# Patient Record
Sex: MALE | Race: WHITE | Employment: FULL TIME | URBAN - METROPOLITAN AREA
[De-identification: names, ages, dates, MRNs, and addresses within clinical notes are randomized per-mention and may not be internally consistent; named-entity substitution may affect disease eponyms.]

---
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
## 2022-06-16 ENCOUNTER — OFFICE VISIT (OUTPATIENT)
Dept: ORTHOPEDIC SURGERY | Age: 22
End: 2022-06-16
Payer: COMMERCIAL

## 2022-06-16 VITALS — HEIGHT: 73 IN | BODY MASS INDEX: 23.86 KG/M2 | WEIGHT: 180 LBS

## 2022-06-16 DIAGNOSIS — S43.432A SUPERIOR LABRUM ANTERIOR-TO-POSTERIOR (SLAP) TEAR OF LEFT SHOULDER: ICD-10-CM

## 2022-06-16 DIAGNOSIS — M25.512 ACUTE PAIN OF LEFT SHOULDER: Primary | ICD-10-CM

## 2022-06-16 PROCEDURE — 99203 OFFICE O/P NEW LOW 30 MIN: CPT | Performed by: ORTHOPAEDIC SURGERY

## 2022-06-16 RX ORDER — LIDOCAINE 50 MG/G
1 PATCH TOPICAL DAILY
COMMUNITY
Start: 2021-10-20

## 2022-06-16 RX ORDER — LANSOPRAZOLE 30 MG/1
30 CAPSULE, DELAYED RELEASE ORAL DAILY
COMMUNITY
Start: 2021-10-17

## 2022-06-16 NOTE — PROGRESS NOTES
Joe Vieira presents today for traumatic injury involving his left shoulder. He works as a . He was using 2 pipe wrenches about 2 weeks ago when he felt a tearing popping sensation in the left shoulder. But the next day the pain was so intense he has been incapable of continue with activities. Rest pain is 2 out of 10 but worse with activity. He has pain lifting, raising his arm, reaching across the body and behind his back. He has been using ice and ibuprofen. He denies any prior shoulder problems. He is otherwise healthy. He is right-hand dominant. History: Patient's relevant past family, medical, and social history are reviewed as part of today's visit. ROS of pertinent positives and negatives as above; otherwise negative. General Exam:    Vitals: Height 6' 1\" (1.854 m), weight 180 lb (81.6 kg). Constitutional: Patient is adequately groomed with no evidence of malnutrition  Mental Status: The patient is oriented to time, place and person. The patient's mood and affect are appropriate. Gait:  Patient walks with normal gait and station. Lymphatic: The lymphatic examination bilaterally reveals all areas to be without enlargement or induration. Vascular: Examination reveals no swelling or calf tenderness. Peripheral pulses are palpable and 2+. Neurological: The patient has good coordination. There is no weakness or sensory deficit. Skin:    Head/Neck: inspection reveals no rashes, ulcerations or lesions. Trunk:  inspection reveals no rashes, ulcerations or lesions. Right Lower Extremity: inspection reveals no rashes, ulcerations or lesions. Left Lower Extremity: inspection reveals no rashes, ulcerations or lesions. Examination of the cervical spine reveals no restriction in motion. There are no reproduction of symptoms into either arm with flexion, extension, rotation or palpation. The patient has a negative Spurling sign, and no tenderness.     Examination of the right shoulder reveals normal scapular control and no prominence. There is no pain over the acromioclavicular or sternoclavicular joints. The patient has no biceps pain. There is full range of motion. There is no pain with impingement testing. There is no pain with Anderson maneuver. Ida's maneuver is normal.  There is no pain or apprehension in the abducted externally rotated position. There is no sulcus sign. There is no instability with anterior or posterior stress applied. The patient demonstrates full strength in the supraspinatus, infraspinatus, and subscapularis. Neurologic and vascular examination of the upper extremity  is normal.    Left shoulder has significant discomfort with cross body adduction and pain stressing the supraspinatus as well as with Ida's maneuver. He has mild tenderness anteriorly over the bicep. He has no gross instability. Neurologic and vascular exam to the left upper extremity are normal.    Xrays of the left shoulder were obtained today in the office and interpreted by me. AP in the scapular plane, axillary lateral, and scapular Y. These demonstrate: No bony abnormality        Assessment: Left shoulder superior labrum tear versus cuff tear    Plan: MRI left shoulder.     Follow-up with me after the scan

## 2022-06-23 ENCOUNTER — OFFICE VISIT (OUTPATIENT)
Dept: ORTHOPEDIC SURGERY | Age: 22
End: 2022-06-23
Payer: COMMERCIAL

## 2022-06-23 VITALS — BODY MASS INDEX: 23.86 KG/M2 | RESPIRATION RATE: 12 BRPM | HEIGHT: 73 IN | WEIGHT: 180 LBS

## 2022-06-23 DIAGNOSIS — S46.912A LEFT SHOULDER STRAIN, INITIAL ENCOUNTER: ICD-10-CM

## 2022-06-23 DIAGNOSIS — M25.512 ACUTE PAIN OF LEFT SHOULDER: Primary | ICD-10-CM

## 2022-06-23 PROCEDURE — 99213 OFFICE O/P EST LOW 20 MIN: CPT | Performed by: ORTHOPAEDIC SURGERY

## 2022-06-23 RX ORDER — METHYLPREDNISOLONE 4 MG/1
TABLET ORAL
Qty: 1 KIT | Refills: 0 | Status: SHIPPED | OUTPATIENT
Start: 2022-06-23 | End: 2022-08-22 | Stop reason: ALTCHOICE

## 2022-06-23 NOTE — PROGRESS NOTES
Surinder Garner returns today to follow-up his left shoulder. With activity pain is about 2 out of 10. He says quick movements seem to cause more discomfort and slower ones. He is a bit frustrated. We obtained his MRI. General Exam:    Vitals: Resp. rate 12, height 6' 1\" (1.854 m), weight 180 lb (81.6 kg). Constitutional: Patient is adequately groomed with no evidence of malnutrition  Mental Status: The patient is oriented to time, place and person. The patient's mood and affect are appropriate. Left shoulder today has no anterior discomfort or apprehension. He has mild pain with [place]'s maneuver. I am not able to elicit much in terms of discomfort. MRI left shoulder is reviewed. It demonstrates  Exam Date: 06/22/2022   Exam Description: MR Left Shoulder joint w/o Contrast            HISTORY:  Evaluate left shoulder pain.       TECHNICAL FACTORS:  Long- and short-axis fat- and water-weighted images were performed.       COMPARISON:  None.       FINDINGS:  Long head biceps is intact       Mild cuff tendinopathy and peritendinitis.  No cuff tear.       No fracture.  No mass.       No muscle strain.  No muscle atrophy.       Edema changes within the glenoid.  Red marrow variation present.       Sublabral foramen or hole present.  Lobulated region of fluid signal anterior to the sublabral    hole measures 10 x 5 x 9 mm.  Capsular fluid prolapses into this defect as a variant favored    over a paralabral cyst.       CONCLUSION:   1. Mild cuff tendinopathy and peritendinitis. No cuff tear. 2. Sublabral foramen or hole present. Lobulated fluid signal anterior to the sublabral hole    measures 10 x 5 x 9 mm. Capsular fluid prolapses into this defect as a variant favored over a    paralabral cyst.   3. No fracture, AVN or mass. I reviewed this findings on the report and the images with the patient. Assessment: Left shoulder sublabial foramen versus capsular injury.     Plan: Given his occupation as a , it would be very challenging for him to consider surgical treatment. His exam today is benign and I do not think surgery is indicated. I am recommending a Medrol Dosepak that he should start tomorrow. We will also start therapy. Follow-up with me in 3 weeks. He agrees with this plan.

## 2022-07-28 ENCOUNTER — HOSPITAL ENCOUNTER (OUTPATIENT)
Dept: PHYSICAL THERAPY | Age: 22
Setting detail: THERAPIES SERIES
Discharge: HOME | End: 2022-07-28
Payer: COMMERCIAL

## 2022-07-28 PROCEDURE — 97530 THERAPEUTIC ACTIVITIES: CPT

## 2022-07-28 PROCEDURE — 97110 THERAPEUTIC EXERCISES: CPT

## 2022-07-28 PROCEDURE — 97161 PT EVAL LOW COMPLEX 20 MIN: CPT

## 2022-07-28 NOTE — FLOWSHEET NOTE
[facility], [place]      Physical Therapy Daily Treatment Note  Date:  2022    Patient Name:  Princess Shepard    :  2000  MRN: [id number]  Medical/Treatment Diagnosis Information:  Diagnosis: Q46.809B (ICD-10-CM) - Left shoulder strain, initial encounter  Treatment Diagnosis: M25.512 Left shoulder pain  Insurance/Certification information:  PT Insurance Information: [employer]  Physician Information:  Referring Provider (secondary): Qi  Has the plan of care been signed (Y/N):        []  Yes  [x]  No     Date of Patient follow up with Physician:       Is this a Progress Report:     []  Yes  [x]  No        Progress report will be due (10 Rx or 30 days whichever is less):        Recertification will be due (POC Duration  / 90 days whichever is less):          Visit # Insurance Allowable Auth Required   1 30 []  Yes []  No        Functional Scale: FOTO 52    Date assessed:  22      Latex Allergy:  [x]NO      []YES  Preferred Language for Healthcare:   [x]English       []other:    Pain level:  10 with activity/lifting /10     SUBJECTIVE:  See eval    OBJECTIVE: See eval  Observation:   Test measurements:      ROM PROM AROM  Comment    L R L R    Flexion        Abduction        ER        IR        Other        Other             Strength L R Comment   Flexion      Abduction      ER      IR      Supraspinatus      Upper Trap      Lower Trap      Mid Trap      Rhomboids      Biceps      Triceps      Horizontal Abduction      Horizontal Adduction      Lats          RESTRICTIONS/PRECAUTIONS:     Exercises/Interventions:   Exercise/Equipment Resistance/Repetitions Other comments   Aerobic Conditioning     Aerodyne          Stretching/PROM     Wand ER at 90 10x10     Table Slides     Wall slides  Flex 10x10     UE [place]     Pulleys     Pendulum     BB IR     SL IR     Pec doorway stretch     CBA stretch     UT stretch     LS stretch     Isometrics     Retraction      Weight shift     Flexion 10x10    Abduction 10x10    External Rotation     Internal Rotation     Biceps     Triceps          PRE's     Flexion     Abduction     External Rotation 3x10 SL 0#    Internal Rotation     Shrugs     EXT     Reverse Flys     Serratus     Horizontal Abd with ER     Biceps     Triceps     Retraction Prone T LUE only 3x10          Cable Column/Theraband     External Rotation     Internal Rotation     Shrugs     Lats     Ext     Flex     Scapular Retraction     BIC     TRIC     PNF          Dynamic Stability          Plyoback                Therapeutic Exercise and NMR EXR  [] (80041) Provided verbal/tactile cueing for activities related to strengthening, flexibility, endurance, ROM  for improvements in scapular, scapulothoracic and UE control with self care, reaching, carrying, lifting, house/yardwork, driving/computer work.    [] (51690) Provided verbal/tactile cueing for activities related to improving balance, coordination, kinesthetic sense, posture, motor skill, proprioception  to assist with  scapular, scapulothoracic and UE control with self care, reaching, carrying, lifting, house/yardwork, driving/computer work. Therapeutic Activities:    [] (43915 or 51096) Provided verbal/tactile cueing for activities related to improving balance, coordination, kinesthetic sense, posture, motor skill, proprioception and motor activation to allow for proper function of scapular, scapulothoracic and UE control with self care, carrying, lifting, driving/computer work.      Home Exercise Program:    [x] (77319) Reviewed/Progressed HEP activities related to strengthening, flexibility, endurance, ROM of scapular, scapulothoracic and UE control with self care, reaching, carrying, lifting, house/yardwork, driving/computer work  [] (13551) Reviewed/Progressed HEP activities related to improving balance, coordination, kinesthetic sense, posture, motor skill, proprioception of scapular, scapulothoracic and UE control with self care, reaching, carrying, lifting, house/yardwork, driving/computer work      Manual Treatments:  PROM / STM / Oscillations-Mobs:  G-I, II, III, IV (PA's, Inf., Post.)  [] (82036) Provided manual therapy to mobilize soft tissue/joints of cervical/CT, scapular GHJ and UE for the purpose of modulating pain, promoting relaxation,  increasing ROM, reducing/eliminating soft tissue swelling/inflammation/restriction, improving soft tissue extensibility and allowing for proper ROM for normal function with self care, reaching, carrying, lifting, house/yardwork, driving/computer work    Modalities:      Charges:  Timed Code Treatment Minutes: 24   Total Treatment Minutes: 41     [x] EVAL (LOW) 05177   [] EVAL (MOD) 13449   [] EVAL (HIGH) 83560   [] RE-EVAL   [x] SN(83881) x   1  [] IONTO  [x] NMR (37096) x   1  [] VASO  [] Manual (16184) x      [] Other:  [] TA x      [] Mech Traction (39171)  [] ES(attended) (08604)      [] ES (un) (78639):     HEP instruction: Access Code: [email or web address]  URL: [email or web address]  Date: 07/28/2022  Prepared by: Leon Sultana     Exercises  Shoulder Flexion Wall Slide with Towel - 1 x daily - 7 x weekly - 10 reps - 10 hold  Supine Shoulder External Rotation in 45 Degrees Abduction AAROM with Dowel - 1 x daily - 7 x weekly - 10 reps - 10 hold  Sidelying Shoulder External Rotation - 1 x daily - 7 x weekly - 3 sets - 10 reps  Prone Single Arm Shoulder Horizontal Abduction with Scapular Retraction and Palm Down - 1 x daily - 7 x weekly - 3 sets - 10 reps  Isometric Shoulder Flexion at Wall - 1 x daily - 7 x weekly - 10 reps - 10 hold  Isometric Shoulder Abduction at Wall - 1 x daily - 7 x weekly - 10 reps - 10 hold        GOALS:  Patient stated goal: return to work activities and lifting without pain     [] Progressing: [] Met: [] Not Met: [] Adjusted     Therapist goals for Patient:  Short Term Goals: To be achieved in: 2 weeks  1.  Independent in HEP and progression per patient tolerance, in order to prevent re-injury. [] Progressing: [] Met: [] Not Met: [] Adjusted  2. Patient will have a decrease in pain to facilitate improvement in movement, function, and ADLs as indicated by Functional Deficits. [] Progressing: [] Met: [] Not Met: [] Adjusted     Long Term Goals: To be achieved in: 4-6 weeks  1. Disability index score of 75% or greater on FOTO  to assist with reaching prior level of function. [] Progressing: [] Met: [] Not Met: [] Adjusted  2. Patient will demonstrate increased AROM to wfl painfree to allow for proper joint functioning as indicated by patients Functional Deficits. [] Progressing: [] Met: [] Not Met: [] Adjusted  3. Patient will demonstrate an increase in Strength to 4+/5 or greater to allow for proper functional mobility as indicated by patients Functional Deficits. [] Progressing: [] Met: [] Not Met: [] Adjusted  4. Patient will return to ADLs such as donning/doffing shirt without increased symptoms or restriction. [] Progressing: [] Met: [] Not Met: [] Adjusted  5. Patient will return to lifting greater than 20lbs in order to perform work duties without increased symptoms or restriction. [] Progressing: [] Met: [] Not Met: [] Adjusted                       Overall Progression Towards Functional goals/ Treatment Progress Update:  [] Patient is progressing as expected towards functional goals listed. [] Progression is slowed due to complexities/Impairments listed. [] Progression has been slowed due to co-morbidities.   [x] Plan just implemented, too soon to assess goals progression <30days   [] Goals require adjustment due to lack of progress  [] Patient is not progressing as expected and requires additional follow up with physician  [] Other    Prognosis for POC: [x] Good [] Fair  [] Poor      Patient requires continued skilled intervention: [x] Yes  [] No    Treatment/Activity Tolerance:  [x] Patient able to complete treatment  [] Patient limited by fatigue  [] Patient limited by pain     [] Patient limited by other medical complications  [] Other:         Return to Play: (if applicable)   []  Stage 1: Intro to Strength   []  Stage 2: Return to Run and Strength   []  Stage 3: Return to Jump and Strength   []  Stage 4: Dynamic Strength and Agility   []  Stage 5: Sport Specific Training     []  Ready to Return to Play, Meets All Above Stages   []  Not Ready for Return to Sports   Comments:                               PLAN: See eval  [] Continue per plan of care [] Alter current plan (see comments above)  [x] Plan of care initiated [] Hold pending MD visit [] Discharge  Note: If patient does not return for scheduled/ recommended follow up visits, this note will serve as a discharge from care along with most recent update on progress. Reviewed insurance benefits for physical therapy in an outpatient hospital based setting with the patient, including deductible  and allowable visit number.  Pt was informed of possible out of pocket costs, as well as, informed of other service options for continuing supervised sessions without required skilled PT intervention such as the [facility] program.      Electronically signed by:  Lisa Christianson, PT, DPT, Cert DN

## 2022-07-28 NOTE — PLAN OF CARE
[facility] [address] [place], [address]  Phone: [phone]   Fax: [phone]          Physical Therapy Certification    Dear Referring Provider (secondary): Qi,    We had the pleasure of evaluating the following patient for physical therapy services at [address] [place] [address]. A summary of our findings can be found in the initial assessment below. This includes our plan of care. If you have any questions or concerns regarding these findings, please do not hesitate to contact me at the office phone number checked above. Thank you for the referral.       Physician Signature:_______________________________Date:__________________  By signing above (or electronic signature), therapists plan is approved by physician      Patient: Billy Schmitz   : 2000   MRN: [id number]  Referring Physician: Referring Provider (secondary): Qi      Evaluation Date: 2022      Medical Diagnosis Information:  Diagnosis: W38.273O (ICD-10-CM) - Left shoulder strain, initial encounter   Treatment Diagnosis: M25.512 Left shoulder pain                                           Precautions/ Contra-indications:   Latex Allergy:  [x]NO      []YES  Preferred Language for Healthcare:   [x]English       []Other:    C-SSRS Triggered by Intake questionnaire (Past 2 wk assessment):   [x] No, Questionnaire did not trigger screening.   [] Yes, Patient intake triggered further evaluation      [] C-SSRS Screening completed  [] PCP notified via Plan of Care  [] Emergency services notified     SUBJECTIVE:   Per MD note : Billy Schmitz presents today for traumatic injury involving his left shoulder. He works as a . He was using 2 pipe wrenches about 2 weeks ago when he felt a tearing popping sensation in the left shoulder. But the next day the pain was so intense he has been incapable of continue with activities.   Rest pain is 2 out of 10 but worse with activity. He has pain lifting, raising his arm, reaching across the body and behind his back. He has been using ice and ibuprofen. He denies any prior shoulder problems. He is otherwise healthy. He is right-hand dominant. CONCLUSION:   1. Mild cuff tendinopathy and peritendinitis. No cuff tear. 2. Sublabral foramen or hole present. Lobulated fluid signal anterior to the sublabral hole   measures 10 x 5 x 9 mm. Capsular fluid prolapses into this defect as a variant favored over a   paralabral cyst.   3. No fracture, AVN or mass. Today: pt was prescribed medrol dosepak on 6/23. He notes that the dosepak didn't do much for his symptoms. He notes that he still has difficulty reaching/lifting left shoulder is painful. He notes that he has difficulty with taking shirt on and off. He notes it is uncomfortable trying to fall asleep and can't lay on the left side. He notes it is difficult to reach towards turn signal and turning steering wheel with left arm.      Relevant Medical History:non significant   Functional Disability Index: FOTO     Pain Scale: 0/10 at rest; 10/10 at worst with activity (quick sharp pain)   Easing factors: ice, ibuprofen  Provocative factors: reaching, lifting, carrying      Type: []Constant   []Intermittent  []Radiating []Localized []other:     Numbness/Tingling: pt denies     Functional Limitations/Impairments: []Lifting/reaching []Grooming []Carrying    []ADL's []Driving []Sports/Recreations   []Other:    Occupation/School: [employer]    Living Status/Prior Level of Function: Independent with ADLs and IADLs, lifting/working out       OBJECTIVE:    Joint mobility:    []Normal    []Hypo   [x]Hyper    Palpation: no TTP    Functional Mobility/Transfers: wfl     Posture: forward head, rounded shoulders/increased thoracic kyphosis     Bandages/Dressings/Incisions: na    Gait: (include devices/WB status) wfl    Orthopedic Special Tests:     ROM PROM AROM  Comment L R L R    Flexion   155; discomfort 165    Abduction   155; discomfort  165    ER        IR        Other        Other             Strength L R Comment   Flexion 4 4+    Abduction 4- 4+    ER 4 4+    IR 4+ 4+    Supraspinatus      Upper Trap      Lower Trap      Mid Trap      Rhomboids      Biceps      Triceps      Horizontal Abduction      Horizontal Adduction      Lats        Special Tests Left Right   Apley Scratch IR:t4  ER: c7; discomfort   Cross body: superior shoulder discomfort  IR:t4  ER: c7  Cross Body:   Neer's     Full Can     Empty Can     Jannell Liliana     Nerve Tension Testing     Speed's     Martins's      Spurling's     Repeated Scaption                                       [x] Patient history, allergies, meds reviewed. Medical chart reviewed. See intake form. Review Of Systems (ROS):  [x]Performed Review of systems (Integumentary, CardioPulmonary, Neurological) by intake and observation. Intake form has been scanned into medical record. Patient has been instructed to contact their primary care physician regarding ROS issues if not already being addressed at this time.     Co-morbidities/Complexities (which will affect course of rehabilitation):   [x]None           Arthritic conditions   []Rheumatoid arthritis (M05.9)  []Osteoarthritis (M19.91)   Cardiovascular conditions   []Hypertension (I10)  []Hyperlipidemia (E78.5)  []Angina pectoris (I20)  []Atherosclerosis (I70)   Musculoskeletal conditions   []Disc pathology   []Congenital spine pathologies   []Prior surgical intervention  []Osteoporosis (M81.8)  []Osteopenia (M85.8)   Endocrine conditions   []Hypothyroid (E03.9)  []Hyperthyroid Gastrointestinal conditions   []Constipation (J42.43)   Metabolic conditions   []Morbid obesity (E66.01)  []Diabetes type 1(E10.65) or 2 (E11.65)   []Neuropathy (G60.9)     Pulmonary conditions   []Asthma (J45)  []Coughing   []COPD (J44.9)   Psychological Disorders  []Anxiety (F41.9)  []Depression (F32.9) []Other:   []Other:          Barriers to/and or personal factors that will affect rehab potential:              []Age  []Sex              []Motivation/Lack of Motivation                        []Co-Morbidities              []Cognitive Function, education/learning barriers              []Environmental, home barriers              [x]profession/work barriers: physical job   []past PT/medical experience  []other:       Falls Risk Assessment (30 days):   [x] Falls Risk assessed and no intervention required.   [] Falls Risk assessed and Patient requires intervention due to being higher risk   TUG score (>12s at risk):     [] Falls education provided, including       Functional Assessment:    Functional Assessment scale used: FOTO  Score: 47%      ASSESSMENT:   Functional Impairments   []Noted spinal or UE joint hypomobility   []Noted spinal or UE joint hypermobility   [x]Decreased UE functional ROM   [x]Decreased UE functional strength   []Abnormal reflexes/sensation/myotomal/dermatomal deficits   [x]Decreased RC/scapular/core strength and neuromuscular control   []other:      Functional Activity Limitations (from functional questionnaire and intake)   [x]Reduced ability to tolerate prolonged functional positions   [x]Reduced ability or difficulty with changes of positions or transfers between positions   []Reduced ability to maintain good posture and demonstrate good body mechanics with sitting, bending, and lifting   [x] Reduced ability or tolerance with driving and/or computer work   [x]Reduced ability to sleep   [x]Reduced ability to perform lifting, reaching, carrying tasks   [x]Reduced ability to tolerate impact through UE   []Reduced ability to reach behind back   []Reduced ability to  or hold objects   [x]Reduced ability to throw or toss an object   []other:    Participation Restrictions   [x]Reduced participation in self care activities   [x]Reduced participation in home management activities   [x]Reduced participation in work activities   []Reduced participation in social activities. [x]Reduced participation in sport/recreation activities. Classification:   []Signs/symptoms consistent with post-surgical status including decreased ROM, strength and function. [x]Signs/symptoms consistent with joint sprain/strain   []Signs/symptoms consistent with shoulder impingement   [x]Signs/symptoms consistent with shoulder/elbow/wrist tendinopathy   []Signs/symptoms consistent with Rotator cuff tear   []Signs/symptoms consistent with labral tear   []Signs/symptoms consistent with postural dysfunction    []Signs/symptoms consistent with Glenohumeral IR Deficit - <45 degrees   []Signs/symptoms consistent with facet dysfunction of cervical/thoracic spine    []Signs/symptoms consistent with pathology which may benefit from Dry needling     []other:     Prognosis/Rehab Potential:      []Excellent   [x]Good    []Fair   []Poor    Tolerance of evaluation/treatment:    []Excellent   [x]Good    []Fair   []Poor  PLAN:  Frequency/Duration:  1-2 days per week for 4-6 Weeks:  INTERVENTIONS:  [x] Therapeutic exercise including: strength training, ROM, for Upper extremity and core   [x]  NMR activation and proprioception for UE, scap and Core   [x] Manual therapy as indicated for shoulder, scapula and spine to include: Dry Needling/IASTM, STM, PROM, Gr I-IV mobilizations, manipulation. [x] Modalities as needed that may include: thermal agents, E-stim, Biofeedback, US, iontophoresis as indicated  [x] Patient education on joint protection, postural re-education, activity modification, progression of HEP. HEP instruction: Access Code: [email or web address]  URL: [email or web address]  Date: 07/28/2022  Prepared by: Vinayak Muñiz    Exercises  Shoulder Flexion Wall Slide with Towel - 1 x daily - 7 x weekly - 10 reps - 10 hold  Supine Shoulder External Rotation in 45 Degrees Abduction AAROM with Dowel - 1 x daily - 7 x weekly - 10 reps - 10 hold  Sidelying Shoulder External Rotation - 1 x daily - 7 x weekly - 3 sets - 10 reps  Prone Single Arm Shoulder Horizontal Abduction with Scapular Retraction and Palm Down - 1 x daily - 7 x weekly - 3 sets - 10 reps  Isometric Shoulder Flexion at Wall - 1 x daily - 7 x weekly - 10 reps - 10 hold  Isometric Shoulder Abduction at Wall - 1 x daily - 7 x weekly - 10 reps - 10 hold      GOALS:  Patient stated goal: return to work activities and lifting without pain     [] Progressing: [] Met: [] Not Met: [] Adjusted    Therapist goals for Patient:   Short Term Goals: To be achieved in: 2 weeks  1. Independent in HEP and progression per patient tolerance, in order to prevent re-injury. [] Progressing: [] Met: [] Not Met: [] Adjusted   2. Patient will have a decrease in pain to facilitate improvement in movement, function, and ADLs as indicated by Functional Deficits. [] Progressing: [] Met: [] Not Met: [] Adjusted    Long Term Goals: To be achieved in: 4-6 weeks  1. Disability index score of 75% or greater on FOTO  to assist with reaching prior level of function. [] Progressing: [] Met: [] Not Met: [] Adjusted  2. Patient will demonstrate increased AROM to wfl painfree to allow for proper joint functioning as indicated by patients Functional Deficits. [] Progressing: [] Met: [] Not Met: [] Adjusted  3. Patient will demonstrate an increase in Strength to 4+/5 or greater to allow for proper functional mobility as indicated by patients Functional Deficits. [] Progressing: [] Met: [] Not Met: [] Adjusted  4. Patient will return to ADLs such as donning/doffing shirt without increased symptoms or restriction. [] Progressing: [] Met: [] Not Met: [] Adjusted  5. Patient will return to lifting greater than 20lbs in order to perform work duties without increased symptoms or restriction.    [] Progressing: [] Met: [] Not Met: [] Adjusted      Physical Therapy Evaluation Complexity Justification  [] A history of present problem with:  [x] no personal factors and/or comorbidities that impact the plan of care;  []1-2 personal factors and/or comorbidities that impact the plan of care  []3 personal factors and/or comorbidities that impact the plan of care  [] An examination of body systems using standardized tests and measures addressing any of the following: body structures and functions (impairments), activity limitations, and/or participation restrictions;:  [] a total of 1-2 or more elements   [x] a total of 3 or more elements   [] a total of 4 or more elements   [] A clinical presentation with:  [x] stable and/or uncomplicated characteristics   [] evolving clinical presentation with changing characteristics  [] unstable and unpredictable characteristics;   [] Clinical decision making of [] low, [] moderate, [] high complexity using standardized patient assessment instrument and/or measurable assessment of functional outcome.     [x] EVAL (LOW) 93927 (typically 20 minutes face-to-face)  [] EVAL (MOD) 76312 (typically 30 minutes face-to-face)  [] EVAL (HIGH) 78479 (typically 45 minutes face-to-face)  [] RE-EVAL 44933    Electronically signed by:      Vaughn Hall, PT, DPT, Cert DN

## 2022-08-04 ENCOUNTER — HOSPITAL ENCOUNTER (OUTPATIENT)
Dept: PHYSICAL THERAPY | Age: 22
Setting detail: THERAPIES SERIES
Discharge: HOME | End: 2022-08-04
Payer: COMMERCIAL

## 2022-08-04 PROCEDURE — 97110 THERAPEUTIC EXERCISES: CPT

## 2022-08-04 PROCEDURE — 97112 NEUROMUSCULAR REEDUCATION: CPT

## 2022-08-04 NOTE — FLOWSHEET NOTE
[facility], [place]      Physical Therapy Daily Treatment Note  Date:  2022    Patient Name:  Ferdinand Hansen    :  2000  MRN: [id number]  Medical/Treatment Diagnosis Information:  Diagnosis: H52.816X (ICD-10-CM) - Left shoulder strain, initial encounter  Treatment Diagnosis: M25.512 Left shoulder pain  Insurance/Certification information:  PT Insurance Information: [place]  Physician Information:  Referring Provider (secondary): Qi  Has the plan of care been signed (Y/N):        []  Yes  [x]  No     Date of Patient follow up with Physician:       Is this a Progress Report:     []  Yes  [x]  No        Progress report will be due (10 Rx or 30 days whichever is less): 3/35/93       Recertification will be due (POC Duration  / 90 days whichever is less):          Visit # Insurance Allowable Auth Required   2 30 []  Yes []  No        Functional Scale: FOTO 52    Date assessed:  22      Latex Allergy:  [x]NO      []YES  Preferred Language for Healthcare:   [x]English       []other:    Pain level:  10 with activity/lifting /10     SUBJECTIVE:  pt notes that he does not f    OBJECTIVE: See eval  Observation:   Test measurements:      ROM PROM AROM  Comment    L R L R    Flexion        Abduction        ER        IR        Other        Other             Strength L R Comment   Flexion      Abduction      ER      IR      Supraspinatus      Upper Trap      Lower Trap      Mid Trap      Rhomboids      Biceps      Triceps      Horizontal Abduction      Horizontal Adduction      Lats          RESTRICTIONS/PRECAUTIONS:     Exercises/Interventions:   Exercise/Equipment Resistance/Repetitions Other comments   Aerobic Conditioning     Aerodyne          Stretching/PROM     Wand    Table Slides     Wall slides  Flex 10x10     UE [place]     Pulleys     Pendulum     BB IR     SL IR 10x10    Pec doorway stretch 90-90 10x10     CBA stretch     UT stretch     LS stretch     Isometrics Retraction          Weight shift     Flexion    Abduction    External Rotation     Internal Rotation     Biceps     Triceps          PRE's     Flexion     Abduction     External Rotation    Internal Rotation     Shrugs     EXT     Reverse Flys     Serratus 3x10 blue band supine     Horizontal Abd with ER     Biceps     Triceps     Retraction           Cable Column/Theraband     External Rotation 3x10 red     Internal Rotation     Shrugs     Lats     Ext     Flex     Scapular Retraction 3x10 black     BIC     TRIC     PNF          Dynamic Stability          P[place]oback                Therapeutic Exercise and NMR EXR  [] (02084) Provided verbal/tactile cueing for activities related to strengthening, flexibility, endurance, ROM  for improvements in scapular, scapulothoracic and UE control with self care, reaching, carrying, lifting, house/yardwork, driving/computer work.    [] (05967) Provided verbal/tactile cueing for activities related to improving balance, coordination, kinesthetic sense, posture, motor skill, proprioception  to assist with  scapular, scapulothoracic and UE control with self care, reaching, carrying, lifting, house/yardwork, driving/computer work. Therapeutic Activities:    [] (88494 or 35452) Provided verbal/tactile cueing for activities related to improving balance, coordination, kinesthetic sense, posture, motor skill, proprioception and motor activation to allow for proper function of scapular, scapulothoracic and UE control with self care, carrying, lifting, driving/computer work.      Home Exercise Program:    [x] (14179) Reviewed/Progressed HEP activities related to strengthening, flexibility, endurance, ROM of scapular, scapulothoracic and UE control with self care, reaching, carrying, lifting, house/yardwork, driving/computer work  [] (96376) Reviewed/Progressed HEP activities related to improving balance, coordination, kinesthetic sense, posture, motor skill, proprioception of scapular, scapulothoracic and UE control with self care, reaching, carrying, lifting, house/yardwork, driving/computer work      Manual Treatments:  PROM / STM / Oscillations-Mobs:  G-I, II, III, IV (PA's, Inf., Post.)  [] (04126) Provided manual therapy to mobilize soft tissue/joints of cervical/CT, scapular GHJ and UE for the purpose of modulating pain, promoting relaxation,  increasing ROM, reducing/eliminating soft tissue swelling/inflammation/restriction, improving soft tissue extensibility and allowing for proper ROM for normal function with self care, reaching, carrying, lifting, house/yardwork, driving/computer work    Modalities:      Charges:  Timed Code Treatment Minutes: 39   Total Treatment Minutes: 41     [] EVAL (LOW) 48837   [] EVAL (MOD) 18823   [] EVAL (HIGH) 62953   [] RE-EVAL   [x] IV(79703) x   2  [] IONTO  [x] NMR (00334) x   1  [] VASO  [] Manual (36155) x      [] Other:  [] TA x      [] Mech Traction (57998)  [] ES(attended) (19592)      [] ES (un) (30056):     HEP instruction:   Access Code: [email or web address]  URL: [email or web address]  Date: 08/04/2022  Prepared by: Deanne Pierre    Exercises  Shoulder Flexion Wall Slide with Towel - 1 x daily - 7 x weekly - 10 reps - 10 hold  Doorway Pec Stretch at 90 Degrees Abduction - 1 x daily - 7 x weekly - 10 reps - 10 hold  Sleeper Stretch - 1 x daily - 7 x weekly - 10 reps - 10 hold  Scapular Retraction with Resistance - 1 x daily - 7 x weekly - 3 sets - 10 reps  Shoulder External Rotation with Anchored Resistance - 1 x daily - 7 x weekly - 3 sets - 10 reps  Supine Serratus Punches Resistance - 1 x daily - 7 x weekly - 3 sets - 10 reps          GOALS:  Patient stated goal: return to work activities and lifting without pain     [] Progressing: [] Met: [] Not Met: [] Adjusted     Therapist goals for Patient:  Short Term Goals: To be achieved in: 2 weeks  1. Independent in HEP and progression per patient tolerance, in order to prevent re-injury. [] Progressing: [] Met: [] Not Met: [] Adjusted  2. Patient will have a decrease in pain to facilitate improvement in movement, function, and ADLs as indicated by Functional Deficits. [] Progressing: [] Met: [] Not Met: [] Adjusted     Long Term Goals: To be achieved in: 4-6 weeks  1. Disability index score of 75% or greater on FOTO  to assist with reaching prior level of function. [] Progressing: [] Met: [] Not Met: [] Adjusted  2. Patient will demonstrate increased AROM to wfl painfree to allow for proper joint functioning as indicated by patients Functional Deficits. [] Progressing: [] Met: [] Not Met: [] Adjusted  3. Patient will demonstrate an increase in Strength to 4+/5 or greater to allow for proper functional mobility as indicated by patients Functional Deficits. [] Progressing: [] Met: [] Not Met: [] Adjusted  4. Patient will return to ADLs such as donning/doffing shirt without increased symptoms or restriction. [] Progressing: [] Met: [] Not Met: [] Adjusted  5. Patient will return to lifting greater than 20lbs in order to perform work duties without increased symptoms or restriction. [] Progressing: [] Met: [] Not Met: [] Adjusted                       Overall Progression Towards Functional goals/ Treatment Progress Update:  [] Patient is progressing as expected towards functional goals listed. [] Progression is slowed due to complexities/Impairments listed. [] Progression has been slowed due to co-morbidities.   [x] Plan just implemented, too soon to assess goals progression <30days   [] Goals require adjustment due to lack of progress  [] Patient is not progressing as expected and requires additional follow up with physician  [] Other    Prognosis for POC: [x] Good [] Fair  [] Poor      Patient requires continued skilled intervention: [x] Yes  [] No    Treatment/Activity Tolerance:  [x] Patient able to complete treatment  [] Patient limited by fatigue  [] Patient limited by pain     [] Patient limited by other medical complications  [] Other: modified exercises today to increase strength and mobility. Pt notes some soreness but no increasd pain. If pt symptoms are not beginning to improve by next visit, MD follow up may be warranted. Return to Play: (if applicable)   []  Stage 1: Intro to Strength   []  Stage 2: Return to Run and Strength   []  Stage 3: Return to Jump and Strength   []  Stage 4: Dynamic Strength and Agility   []  Stage 5: Sport Specific Training     []  Ready to Return to Play, Meets All Above Stages   []  Not Ready for Return to Sports   Comments:                               PLAN: See eval  [] Continue per plan of care [] Alter current plan (see comments above)  [x] Plan of care initiated [] Hold pending MD visit [] Discharge  Note: If patient does not return for scheduled/ recommended follow up visits, this note will serve as a discharge from care along with most recent update on progress. Reviewed insurance benefits for physical therapy in an outpatient hospital based setting with the patient, including deductible  and allowable visit number.  Pt was informed of possible out of pocket costs, as well as, informed of other service options for continuing supervised sessions without required skilled PT intervention such as the [facility] program.      Electronically signed by:  Leon Sultana, PT, DPT, Cert DN

## 2022-08-11 ENCOUNTER — HOSPITAL ENCOUNTER (OUTPATIENT)
Dept: PHYSICAL THERAPY | Age: 22
Setting detail: THERAPIES SERIES
Discharge: HOME | End: 2022-08-11
Payer: COMMERCIAL

## 2022-08-11 PROCEDURE — 97110 THERAPEUTIC EXERCISES: CPT

## 2022-08-11 NOTE — PLAN OF CARE
[facility], [place]    Physical Therapy Re-Certification Plan of Asha Rogers      Dear  Dr. Lex Jackson,     We had the pleasure of treating the following patient for physical therapy services at [address] [place] [address]. A summary of our findings can be found in the updated assessment below. This includes our plan of care. If you have any questions or concerns regarding these findings, please do not hesitate to contact me at the office phone number checked above. Thank you for the referral.     Physician Signature:________________________________Date:__________________  By signing above (or electronic signature), therapists plan is approved by physician    Date Range Of Visits: 22-22  Total Visits to Date: 3  Overall Response to Treatment:   []Patient is responding well to treatment and improvement is noted with regards  to goals   []Patient should continue to improve in reasonable time if they continue HEP   []Patient has plateaued and is no longer responding to skilled PT intervention    []Patient is getting worse and would benefit from return to referring MD   []Patient unable to adhere to initial POC   [x]Other: Pt shows improved ROM and improved strength of L shoulder, however continues to have significant levels of pain with heavy lifting and overhead activities required for his job. He notes that some ADLs are slightly improved such as driving but does still cause some level of pain. At this time, it is recommended pt follow up with MD to discuss unresolved symptoms with conservative care.         Physical Therapy Daily Treatment Note  Date:  2022    Patient Name:  Shara Tanner    :  2000  MRN: [id number]  Medical/Treatment Diagnosis Information:  Diagnosis: M32.076X (ICD-10-CM) - Left shoulder strain, initial encounter  Treatment Diagnosis: M25.512 Left shoulder pain  Insurance/Certification information:  PT Insurance Information: [employer]  Physician Information:  Referring Provider (secondary): Qi  Has the plan of care been signed (Y/N):        []  Yes  [x]  No     Date of Patient follow up with Physician:       Is this a Progress Report:     []  Yes  [x]  No        Progress report will be due (10 Rx or 30 days whichever is less): 5/40/40       Recertification will be due (POC Duration  / 90 days whichever is less):          Visit # Insurance Allowable Auth Required   3 30 []  Yes []  No        Functional Scale: FOTO 52    Date assessed:  7/28/22      Latex Allergy:  [x]NO      []YES  Preferred Language for Healthcare:   [x]English       []other:    Pain level:  10 with activity/lifting /10     SUBJECTIVE:  Pt notes that he feels slightly more comfortable with smaller daily tasks like driving but if he tries to lift anything he will still have increased sharp pain that is the same intensity that hes been     OBJECTIVE: 08/11/22   Observation:   Test measurements:      ROM PROM AROM  Comment    L R L R    Flexion   163     Abduction   150; painful at end range      ER   85     IR   68     Other        Other             Strength L R Comment   Flexion 4; discomfort     Abduction 4; discomfort      ER 4+     IR 4+     Supraspinatus      Upper Trap      Lower Trap      Mid Trap      Rhomboids      Biceps      Triceps      Horizontal Abduction      Horizontal Adduction      Lats          RESTRICTIONS/PRECAUTIONS:     Exercises/Interventions:   Exercise/Equipment Resistance/Repetitions Other comments   Aerobic Conditioning     Aerodyne          Stretching/PROM     Wand    Table Slides     Wall slides  Flex 10x10     UE [place]     Pulleys     Pendulum     BB IR     SL IR 10x10    Pec doorway stretch 90-90 10x10     CBA stretch     UT stretch     LS stretch     Isometrics     Retraction          Weight shift     Flexion    Abduction    External Rotation     Internal Rotation     Biceps     Triceps          PRE's     Flexion     Abduction External Rotation    Internal Rotation     Shrugs     EXT     Reverse Flys     Serratus 3x10 blue band supine     Horizontal Abd with ER     Biceps     Triceps     Retraction           Cable Column/Theraband     External Rotation 3x10 red     Internal Rotation     Shrugs     Lats     Ext     Flex     Scapular Retraction 3x10 black     BIC     TRIC     PNF          Dynamic Stability          Plyoback                Therapeutic Exercise and NMR EXR  [] (79193) Provided verbal/tactile cueing for activities related to strengthening, flexibility, endurance, ROM  for improvements in scapular, scapulothoracic and UE control with self care, reaching, carrying, lifting, house/yardwork, driving/computer work.    [] (71646) Provided verbal/tactile cueing for activities related to improving balance, coordination, kinesthetic sense, posture, motor skill, proprioception  to assist with  scapular, scapulothoracic and UE control with self care, reaching, carrying, lifting, house/yardwork, driving/computer work. Therapeutic Activities:    [] (83021 or 12825) Provided verbal/tactile cueing for activities related to improving balance, coordination, kinesthetic sense, posture, motor skill, proprioception and motor activation to allow for proper function of scapular, scapulothoracic and UE control with self care, carrying, lifting, driving/computer work.      Home Exercise Program:    [x] (11377) Reviewed/Progressed HEP activities related to strengthening, flexibility, endurance, ROM of scapular, scapulothoracic and UE control with self care, reaching, carrying, lifting, house/yardwork, driving/computer work  [] (23498) Reviewed/Progressed HEP activities related to improving balance, coordination, kinesthetic sense, posture, motor skill, proprioception of scapular, scapulothoracic and UE control with self care, reaching, carrying, lifting, house/yardwork, driving/computer work      Manual Treatments:  PROM / STM / Oscillations-Mobs: G-I, II, III, IV (Irene, Inf., Post.)  [] (85374) Provided manual therapy to mobilize soft tissue/joints of cervical/CT, scapular GHJ and UE for the purpose of modulating pain, promoting relaxation,  increasing ROM, reducing/eliminating soft tissue swelling/inflammation/restriction, improving soft tissue extensibility and allowing for proper ROM for normal function with self care, reaching, carrying, lifting, house/yardwork, driving/computer work    Modalities:      Charges:  Timed Code Treatment Minutes: 15   Total Treatment Minutes: 20     [] EVAL (LOW) 47988   [] EVAL (MOD) 13980   [] EVAL (HIGH) 31442   [] RE-EVAL   [x] SHERWOOD(05206) x   1  [] IONTO  [] NMR (03788) x    [] VASO  [] Manual (19851) x      [] Other:  [] TA x      [] Mech Traction (62952)  [] ES(attended) (77464)      [] ES (un) (34900):     HEP instruction:   Access Code: [email or web address]  URL: [email or web address]  Date: 08/04/2022  Prepared by: Juanpablo Cutter    Exercises  Shoulder Flexion Wall Slide with Towel - 1 x daily - 7 x weekly - 10 reps - 10 hold  Doorway Pec Stretch at 90 Degrees Abduction - 1 x daily - 7 x weekly - 10 reps - 10 hold  Sleeper Stretch - 1 x daily - 7 x weekly - 10 reps - 10 hold  Scapular Retraction with Resistance - 1 x daily - 7 x weekly - 3 sets - 10 reps  Shoulder External Rotation with Anchored Resistance - 1 x daily - 7 x weekly - 3 sets - 10 reps  Supine Serratus Punches Resistance - 1 x daily - 7 x weekly - 3 sets - 10 reps          GOALS:  Patient stated goal: return to work activities and lifting without pain     [x] Progressing: [] Met: [] Not Met: [] Adjusted     Therapist goals for Patient:  Short Term Goals: To be achieved in: 2 weeks  1. Independent in HEP and progression per patient tolerance, in order to prevent re-injury. [x] Progressing: [] Met: [] Not Met: [] Adjusted  2.  Patient will have a decrease in pain to facilitate improvement in movement, function, and ADLs as indicated by Functional Deficits. [x] Progressing: [] Met: [] Not Met: [] Adjusted     Long Term Goals: To be achieved in: 4-6 weeks  1. Disability index score of 75% or greater on FOTO  to assist with reaching prior level of function. [x] Progressing: [] Met: [] Not Met: [] Adjusted  2. Patient will demonstrate increased AROM to wfl painfree to allow for proper joint functioning as indicated by patients Functional Deficits. [x] Progressing: [] Met: [] Not Met: [] Adjusted  3. Patient will demonstrate an increase in Strength to 4+/5 or greater to allow for proper functional mobility as indicated by patients Functional Deficits. [x] Progressing: [] Met: [] Not Met: [] Adjusted  4. Patient will return to ADLs such as donning/doffing shirt without increased symptoms or restriction. [x] Progressing: [] Met: [] Not Met: [] Adjusted  5. Patient will return to lifting greater than 20lbs in order to perform work duties without increased symptoms or restriction. [x] Progressing: [] Met: [] Not Met: [] Adjusted                       Overall Progression Towards Functional goals/ Treatment Progress Update:  [] Patient is progressing as expected towards functional goals listed. [] Progression is slowed due to complexities/Impairments listed. [] Progression has been slowed due to co-morbidities. [] Plan just implemented, too soon to assess goals progression <30days   [] Goals require adjustment due to lack of progress  [x] Patient is not progressing as expected and requires additional follow up with physician  [] Other    Prognosis for POC: [x] Good [] Fair  [] Poor      Patient requires continued skilled intervention: [x] Yes  [] No    Treatment/Activity Tolerance:  [x] Patient able to complete treatment  [] Patient limited by fatigue  [] Patient limited by pain     [] Patient limited by other medical complications  [] Other: Pt to follow up with MD, return as needed.           Return to Play: (if applicable)   []  Stage 1: Intro to Strength   []  Stage 2: Return to Run and Strength   []  Stage 3: Return to Jump and Strength   []  Stage 4: Dynamic Strength and Agility   []  Stage 5: Sport Specific Training     []  Ready to Return to Play, Meets All Above Stages   []  Not Ready for Return to Sports   Comments:                               PLAN: See eval  [] Continue per plan of care [] Alter current plan (see comments above)  [x] Plan of care initiated [] Hold pending MD visit [] Discharge  Note: If patient does not return for scheduled/ recommended follow up visits, this note will serve as a discharge from care along with most recent update on progress. Reviewed insurance benefits for physical therapy in an outpatient hospital based setting with the patient, including deductible  and allowable visit number.  Pt was informed of possible out of pocket costs, as well as, informed of other service options for continuing supervised sessions without required skilled PT intervention such as the [facility] program.      Electronically signed by:  Wolf Seals, PT, DPT, Cert TRENT

## 2022-08-22 ENCOUNTER — OFFICE VISIT (OUTPATIENT)
Dept: ORTHOPEDIC SURGERY | Age: 22
End: 2022-08-22
Payer: COMMERCIAL

## 2022-08-22 VITALS — WEIGHT: 180 LBS | BODY MASS INDEX: 23.86 KG/M2 | HEIGHT: 73 IN | RESPIRATION RATE: 12 BRPM

## 2022-08-22 DIAGNOSIS — M25.512 ACUTE PAIN OF LEFT SHOULDER: Primary | ICD-10-CM

## 2022-08-22 DIAGNOSIS — M25.512 PAIN IN LEFT ACROMIOCLAVICULAR JOINT: ICD-10-CM

## 2022-08-22 PROCEDURE — 20610 DRAIN/INJ JOINT/BURSA W/O US: CPT | Performed by: ORTHOPAEDIC SURGERY

## 2022-08-22 RX ORDER — TRIAMCINOLONE ACETONIDE 40 MG/ML
80 INJECTION, SUSPENSION INTRA-ARTICULAR; INTRAMUSCULAR ONCE
Status: COMPLETED | OUTPATIENT
Start: 2022-08-22 | End: 2022-08-22

## 2022-08-22 RX ORDER — LIDOCAINE HYDROCHLORIDE 10 MG/ML
1 INJECTION, SOLUTION INFILTRATION; PERINEURAL ONCE
Status: COMPLETED | OUTPATIENT
Start: 2022-08-22 | End: 2022-08-22

## 2022-08-22 RX ADMIN — TRIAMCINOLONE ACETONIDE 80 MG: 40 INJECTION, SUSPENSION INTRA-ARTICULAR; INTRAMUSCULAR at 17:00

## 2022-08-22 RX ADMIN — LIDOCAINE HYDROCHLORIDE 1 ML: 10 INJECTION, SOLUTION INFILTRATION; PERINEURAL at 17:00

## 2022-08-22 NOTE — PROGRESS NOTES
Kailee Davey returns today for his left shoulder. Overall he has made some progress but still feels challenged with any activity involving weight. General Exam:    Vitals: Resp. rate 12, height 6' 1\" (1.854 m), weight 180 lb (81.6 kg). Constitutional: Patient is adequately groomed with no evidence of malnutrition  Mental Status: The patient is oriented to time, place and person. The patient's mood and affect are appropriate. Today, pain in the left shoulder seems to be centered over the [facility] joint. He has pain with cross body adduction and pain with palpation there. He has no anterior or bicep pain. I again reviewed his MRI which unequivocally shows inflammation around the [facility] joint. Assessment: Refractory left shoulder pain with AC joint symptoms today. Plan: At this point for diagnostic and therapeutic purposes we are going to proceed with a cortisone injection into the [facility] joint. Procedure: Under sterile technique, left shoulder is injected directly superiorly into the [facility] joint with 80 mg of Kenalog and 10 mg of lidocaine. Follow-up with me in a week.

## 2022-08-29 ENCOUNTER — OFFICE VISIT (OUTPATIENT)
Dept: ORTHOPEDIC SURGERY | Age: 22
End: 2022-08-29
Payer: COMMERCIAL

## 2022-08-29 VITALS — HEIGHT: 73 IN | BODY MASS INDEX: 23.86 KG/M2 | RESPIRATION RATE: 12 BRPM | WEIGHT: 180 LBS

## 2022-08-29 DIAGNOSIS — M25.512 PAIN IN LEFT ACROMIOCLAVICULAR JOINT: ICD-10-CM

## 2022-08-29 DIAGNOSIS — M25.512 ACUTE PAIN OF LEFT SHOULDER: Primary | ICD-10-CM

## 2022-08-29 PROCEDURE — 99212 OFFICE O/P EST SF 10 MIN: CPT | Performed by: ORTHOPAEDIC SURGERY

## 2022-08-29 NOTE — PROGRESS NOTES
Kailee Davey 1 week status post left AC joint injection. He reports that he is pain-free today. He said he had increased pain for about 1 day after the injection but has been pain-free since that. He has been working without difficulty. He is pleased. General Exam:    Vitals: Resp. rate 12, height 6' 1\" (1.854 m), weight 180 lb (81.6 kg). Constitutional: Patient is adequately groomed with no evidence of malnutrition  Mental Status: The patient is oriented to time, place and person. The patient's mood and affect are appropriate. Left shoulder today has full motion. He has no tenderness at the [facility] joint or anterior shoulder. He has no pain with cross body adduction. He has full strength. Assessment: Resolving discomfort after AC joint injection. Plan: If he has recurrent symptoms we will have to have a discussion about operative intervention but hopefully that will not be necessary.   Follow-up with me on an as-needed basis

## 2023-11-21 ENCOUNTER — OFFICE VISIT (OUTPATIENT)
Dept: ORTHOPEDIC SURGERY | Age: 23
End: 2023-11-21

## 2023-11-21 VITALS — HEIGHT: 73 IN | BODY MASS INDEX: 23.86 KG/M2 | RESPIRATION RATE: 15 BRPM | WEIGHT: 180 LBS

## 2023-11-21 DIAGNOSIS — M79.671 RIGHT FOOT PAIN: ICD-10-CM

## 2023-11-21 DIAGNOSIS — S92.254A CLOSED NONDISPLACED FRACTURE OF NAVICULAR BONE OF RIGHT FOOT, INITIAL ENCOUNTER: Primary | ICD-10-CM

## 2023-11-21 NOTE — PROGRESS NOTES
see if swelling is reduced and we can switch the cast.              Kain Mcmillan. Sakshi Chaparro MD  Orthopaedic Surgery and Sports Medicine     Disclaimer: This note was generated with use of a verbal recognition program and an attempt was made to check for errors. It is possible that there are still dictated errors within this office note. If so, please bring any significant errors to my attention for an addendum. All efforts were made to ensure that this office note is accurate.

## 2023-11-29 ENCOUNTER — OFFICE VISIT (OUTPATIENT)
Dept: ORTHOPEDIC SURGERY | Age: 23
End: 2023-11-29

## 2023-11-29 VITALS — BODY MASS INDEX: 23.86 KG/M2 | WEIGHT: 180 LBS | HEIGHT: 73 IN

## 2023-11-29 DIAGNOSIS — S92.251A CLOSED DISPLACED FRACTURE OF NAVICULAR BONE OF RIGHT FOOT, INITIAL ENCOUNTER: Primary | ICD-10-CM

## 2023-11-29 RX ORDER — CEPHALEXIN 500 MG/1
500 CAPSULE ORAL 4 TIMES DAILY
Qty: 40 CAPSULE | Refills: 0 | Status: SHIPPED | OUTPATIENT
Start: 2023-11-29 | End: 2023-12-09
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
## 2023-12-13 ENCOUNTER — TELEPHONE (OUTPATIENT)
Dept: ORTHOPEDIC SURGERY | Age: 23
End: 2023-12-13

## 2023-12-13 ENCOUNTER — OFFICE VISIT (OUTPATIENT)
Dept: ORTHOPEDIC SURGERY | Age: 23
End: 2023-12-13

## 2023-12-13 VITALS — HEIGHT: 73 IN | BODY MASS INDEX: 23.84 KG/M2 | WEIGHT: 179.9 LBS

## 2023-12-13 DIAGNOSIS — S92.251A CLOSED DISPLACED FRACTURE OF NAVICULAR BONE OF RIGHT FOOT, INITIAL ENCOUNTER: Primary | ICD-10-CM

## 2023-12-13 PROCEDURE — APPNB15 APP NON BILLABLE TIME 0-15 MINS: Performed by: NURSE PRACTITIONER

## 2023-12-13 PROCEDURE — 99024 POSTOP FOLLOW-UP VISIT: CPT | Performed by: NURSE PRACTITIONER

## 2023-12-13 RX ORDER — CEPHALEXIN 500 MG/1
500 CAPSULE ORAL 4 TIMES DAILY
Qty: 28 CAPSULE | Refills: 0 | Status: SHIPPED | OUTPATIENT
Start: 2023-12-13 | End: 2023-12-20
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
## 2023-12-13 NOTE — TELEPHONE ENCOUNTER
General Question     Subject: [facility] APPT  Patient and /or Facility Request: Teresa Cover  Contact Number: [phone]    PT MOTHER CALLED IN TO SCHED APPT BUT APPT WAS ALREADY [facility] FR 12- AT 9:30 AT [address] [place] [address]. I ALREADY INFORMED PT OF APPT.

## 2024-01-17 ENCOUNTER — OFFICE VISIT (OUTPATIENT)
Dept: ORTHOPEDIC SURGERY | Age: 24
End: 2024-01-17

## 2024-01-17 DIAGNOSIS — S92.251A CLOSED DISPLACED FRACTURE OF NAVICULAR BONE OF RIGHT FOOT, INITIAL ENCOUNTER: Primary | ICD-10-CM

## 2024-01-17 PROCEDURE — 99024 POSTOP FOLLOW-UP VISIT: CPT | Performed by: NURSE PRACTITIONER

## 2024-01-17 PROCEDURE — APPNB15 APP NON BILLABLE TIME 0-15 MINS: Performed by: NURSE PRACTITIONER
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
